# Patient Record
Sex: FEMALE | Race: WHITE | Employment: FULL TIME | URBAN - METROPOLITAN AREA
[De-identification: names, ages, dates, MRNs, and addresses within clinical notes are randomized per-mention and may not be internally consistent; named-entity substitution may affect disease eponyms.]

---

## 2018-02-03 PROCEDURE — 82652 VIT D 1 25-DIHYDROXY: CPT | Performed by: FAMILY MEDICINE

## 2018-02-16 PROBLEM — E66.9 OBESITY (BMI 30-39.9): Status: ACTIVE | Noted: 2018-02-16

## 2018-02-16 PROBLEM — Z86.010 HISTORY OF COLON POLYPS: Status: ACTIVE | Noted: 2018-02-16

## 2018-02-16 PROCEDURE — 88175 CYTOPATH C/V AUTO FLUID REDO: CPT | Performed by: FAMILY MEDICINE

## 2018-02-16 PROCEDURE — 87624 HPV HI-RISK TYP POOLED RSLT: CPT | Performed by: FAMILY MEDICINE

## 2018-08-11 ENCOUNTER — HOSPITAL ENCOUNTER (OUTPATIENT)
Age: 53
Discharge: HOME | End: 2018-08-11
Attending: FAMILY MEDICINE
Payer: COMMERCIAL

## 2018-08-11 ENCOUNTER — APPOINTMENT (OUTPATIENT)
Dept: GENERAL RADIOLOGY | Age: 53
End: 2018-08-11
Attending: FAMILY MEDICINE
Payer: COMMERCIAL

## 2018-08-11 VITALS
TEMPERATURE: 98 F | OXYGEN SATURATION: 98 % | DIASTOLIC BLOOD PRESSURE: 59 MMHG | RESPIRATION RATE: 20 BRPM | SYSTOLIC BLOOD PRESSURE: 137 MMHG | HEART RATE: 75 BPM

## 2018-08-11 DIAGNOSIS — S62.024A CLOSED NONDISPLACED FRACTURE OF MIDDLE THIRD OF SCAPHOID BONE OF RIGHT WRIST, INITIAL ENCOUNTER: Primary | ICD-10-CM

## 2018-08-11 DIAGNOSIS — S69.91XA INJURY OF RIGHT WRIST, INITIAL ENCOUNTER: ICD-10-CM

## 2018-08-11 PROCEDURE — 73110 X-RAY EXAM OF WRIST: CPT | Performed by: FAMILY MEDICINE

## 2018-08-11 PROCEDURE — 29125 APPL SHORT ARM SPLINT STATIC: CPT

## 2018-08-11 PROCEDURE — 99204 OFFICE O/P NEW MOD 45 MIN: CPT

## 2018-08-11 NOTE — ED PROVIDER NOTES
Patient Seen in: [facility]    History   Patient presents with:  Wrist Pain    Stated Complaint: Pain in R wrist x 1 week    HPI    This 71-year-old female presents to the office with complaint of injury to the right wrist which Never Used                      Comment:  1/2 pack per week  Alcohol use: Yes              Comment: 2 glass of wine per month      Review of Systems    Positive for stated complaint: Pain in R wrist x 1 week  Other systems are as noted in HPI.   Constitutio display    Xr Wrist Navicular Complete (4 Views), Right (cpt=73110)    Result Date: 8/11/2018  PROCEDURE:  XR WRIST NAVICULAR COMPLETE (3 VIEWS), RIGHT (CPT=73110)  TECHNIQUE:  Four views were obtained including dedicated navicular view.   COMPARISON:  None Continue to take ibuprofen 600 mg every 6 hours with food as needed for pain. Ice and elevate the right wrist.  Leave the posterior mold in place until you are seen by orthopedics in the next 1-3 days.   Contact your primary physician for orthopedic referr

## 2018-08-13 PROBLEM — S62.001S: Status: ACTIVE | Noted: 2018-08-13

## 2018-08-15 PROBLEM — S62.024A CLOSED NONDISPLACED FRACTURE OF MIDDLE THIRD OF SCAPHOID BONE OF RIGHT WRIST, INITIAL ENCOUNTER: Status: ACTIVE | Noted: 2018-08-15

## 2018-12-11 PROBLEM — S62.024D CLOSED NONDISPLACED FRACTURE OF MIDDLE THIRD OF SCAPHOID OF RIGHT WRIST WITH ROUTINE HEALING, SUBSEQUENT ENCOUNTER: Status: ACTIVE | Noted: 2018-08-15

## 2018-12-11 PROBLEM — S62.001S: Status: RESOLVED | Noted: 2018-08-13 | Resolved: 2018-12-11
